# Patient Record
Sex: FEMALE | Race: WHITE | URBAN - METROPOLITAN AREA
[De-identification: names, ages, dates, MRNs, and addresses within clinical notes are randomized per-mention and may not be internally consistent; named-entity substitution may affect disease eponyms.]

---

## 2019-05-21 ENCOUNTER — NEW PATIENT (OUTPATIENT)
Dept: URBAN - METROPOLITAN AREA CLINIC 60 | Facility: CLINIC | Age: 74
End: 2019-05-21
Payer: COMMERCIAL

## 2019-05-21 DIAGNOSIS — H40.023 OPEN ANGLE WITH BORDERLINE FINDINGS, HIGH RISK, BILATERAL: ICD-10-CM

## 2019-05-21 DIAGNOSIS — H52.03 HYPERMETROPIA, BILATERAL: ICD-10-CM

## 2019-05-21 DIAGNOSIS — H43.813 VITREOUS DEGENERATION, BILATERAL: ICD-10-CM

## 2019-05-21 PROCEDURE — 76514 ECHO EXAM OF EYE THICKNESS: CPT | Performed by: OPTOMETRIST

## 2019-05-21 PROCEDURE — 92004 COMPRE OPH EXAM NEW PT 1/>: CPT | Performed by: OPTOMETRIST

## 2019-05-21 PROCEDURE — 92015 DETERMINE REFRACTIVE STATE: CPT | Performed by: OPTOMETRIST

## 2019-05-21 PROCEDURE — 92250 FUNDUS PHOTOGRAPHY W/I&R: CPT | Performed by: OPTOMETRIST

## 2019-05-21 RX ORDER — CARBOXYMETHYLCELLULOSE SODIUM, GLYCERIN 5; 9 MG/ML; MG/ML
SOLUTION/ DROPS OPHTHALMIC
Qty: 0 | Refills: 0 | Status: INACTIVE
Start: 2019-05-21 | End: 2021-05-19

## 2019-05-21 RX ORDER — PROPYLENE GLYCOL 0.06 MG/ML
0.6 % SOLUTION/ DROPS OPHTHALMIC
Qty: 0 | Refills: 0 | Status: INACTIVE
Start: 2019-05-21 | End: 2021-05-19

## 2019-05-21 ASSESSMENT — VISUAL ACUITY
OS: 20/30
OD: 20/20

## 2019-05-21 ASSESSMENT — INTRAOCULAR PRESSURE
OS: 15
OD: 14

## 2019-07-23 ENCOUNTER — FOLLOW UP ESTABLISHED (OUTPATIENT)
Dept: URBAN - METROPOLITAN AREA CLINIC 60 | Facility: CLINIC | Age: 74
End: 2019-07-23
Payer: COMMERCIAL

## 2019-07-23 PROCEDURE — 92133 CPTRZD OPH DX IMG PST SGM ON: CPT | Performed by: OPTOMETRIST

## 2019-07-23 PROCEDURE — 92012 INTRM OPH EXAM EST PATIENT: CPT | Performed by: OPTOMETRIST

## 2019-07-23 ASSESSMENT — INTRAOCULAR PRESSURE
OD: 26
OS: 22

## 2019-08-02 ENCOUNTER — FOLLOW UP ESTABLISHED (OUTPATIENT)
Dept: URBAN - METROPOLITAN AREA CLINIC 60 | Facility: CLINIC | Age: 74
End: 2019-08-02
Payer: COMMERCIAL

## 2019-08-02 DIAGNOSIS — H40.022 OPEN ANGLE WITH BORDERLINE FINDINGS, HIGH RISK, LEFT EYE: ICD-10-CM

## 2019-08-02 PROCEDURE — 92012 INTRM OPH EXAM EST PATIENT: CPT | Performed by: OPTOMETRIST

## 2019-08-02 PROCEDURE — 92083 EXTENDED VISUAL FIELD XM: CPT | Performed by: OPTOMETRIST

## 2019-08-02 RX ORDER — TIMOLOL MALEATE 5 MG/ML
0.5 % SOLUTION/ DROPS OPHTHALMIC
Qty: 1 | Refills: 0 | Status: INACTIVE
Start: 2019-08-02 | End: 2019-09-12

## 2019-08-02 ASSESSMENT — INTRAOCULAR PRESSURE
OS: 20
OD: 24

## 2019-09-12 ENCOUNTER — FOLLOW UP ESTABLISHED (OUTPATIENT)
Dept: URBAN - METROPOLITAN AREA CLINIC 60 | Facility: CLINIC | Age: 74
End: 2019-09-12
Payer: COMMERCIAL

## 2019-09-12 PROCEDURE — 92012 INTRM OPH EXAM EST PATIENT: CPT | Performed by: OPTOMETRIST

## 2019-09-12 ASSESSMENT — INTRAOCULAR PRESSURE
OD: 17
OS: 20

## 2020-01-16 ENCOUNTER — FOLLOW UP ESTABLISHED (OUTPATIENT)
Dept: URBAN - METROPOLITAN AREA CLINIC 60 | Facility: CLINIC | Age: 75
End: 2020-01-16
Payer: COMMERCIAL

## 2020-01-16 PROCEDURE — 92012 INTRM OPH EXAM EST PATIENT: CPT | Performed by: OPTOMETRIST

## 2020-01-16 RX ORDER — TIMOLOL MALEATE 5 MG/ML
0.5 % SOLUTION/ DROPS OPHTHALMIC
Qty: 3 | Refills: 3 | Status: INACTIVE
Start: 2020-01-16 | End: 2021-03-25

## 2020-01-16 ASSESSMENT — INTRAOCULAR PRESSURE
OS: 22
OD: 24

## 2020-05-13 ENCOUNTER — FOLLOW UP ESTABLISHED (OUTPATIENT)
Dept: URBAN - METROPOLITAN AREA CLINIC 60 | Facility: CLINIC | Age: 75
End: 2020-05-13
Payer: COMMERCIAL

## 2020-05-13 DIAGNOSIS — H25.13 AGE-RELATED NUCLEAR CATARACT, BILATERAL: ICD-10-CM

## 2020-05-13 DIAGNOSIS — H53.2 DIPLOPIA: Primary | ICD-10-CM

## 2020-05-13 PROCEDURE — 92250 FUNDUS PHOTOGRAPHY W/I&R: CPT | Performed by: OPTOMETRIST

## 2020-05-13 PROCEDURE — 92014 COMPRE OPH EXAM EST PT 1/>: CPT | Performed by: OPTOMETRIST

## 2020-05-13 ASSESSMENT — INTRAOCULAR PRESSURE
OD: 20
OS: 20

## 2020-05-13 ASSESSMENT — VISUAL ACUITY
OD: 20/25
OS: 20/25

## 2020-10-08 ENCOUNTER — FOLLOW UP ESTABLISHED (OUTPATIENT)
Dept: URBAN - METROPOLITAN AREA CLINIC 60 | Facility: CLINIC | Age: 75
End: 2020-10-08
Payer: COMMERCIAL

## 2020-10-08 PROCEDURE — 92133 CPTRZD OPH DX IMG PST SGM ON: CPT | Performed by: OPTOMETRIST

## 2020-10-08 PROCEDURE — 92083 EXTENDED VISUAL FIELD XM: CPT | Performed by: OPTOMETRIST

## 2020-10-08 PROCEDURE — 92012 INTRM OPH EXAM EST PATIENT: CPT | Performed by: OPTOMETRIST

## 2020-10-08 ASSESSMENT — INTRAOCULAR PRESSURE
OS: 18
OD: 16

## 2021-02-24 ENCOUNTER — FOLLOW UP ESTABLISHED (OUTPATIENT)
Dept: URBAN - METROPOLITAN AREA CLINIC 60 | Facility: CLINIC | Age: 76
End: 2021-02-24
Payer: COMMERCIAL

## 2021-02-24 PROCEDURE — 99213 OFFICE O/P EST LOW 20 MIN: CPT | Performed by: OPTOMETRIST

## 2021-02-24 ASSESSMENT — INTRAOCULAR PRESSURE
OS: 19
OD: 19

## 2021-05-19 ENCOUNTER — OFFICE VISIT (OUTPATIENT)
Dept: URBAN - METROPOLITAN AREA CLINIC 60 | Facility: CLINIC | Age: 76
End: 2021-05-19
Payer: COMMERCIAL

## 2021-05-19 DIAGNOSIS — H40.1111 PRIMARY OPEN-ANGLE GLAUCOMA, RIGHT EYE, MILD STAGE: Primary | ICD-10-CM

## 2021-05-19 DIAGNOSIS — H40.012 OPEN ANGLE WITH BORDERLINE FINDINGS, LOW RISK, LEFT EYE: ICD-10-CM

## 2021-05-19 DIAGNOSIS — H52.4 PRESBYOPIA: ICD-10-CM

## 2021-05-19 DIAGNOSIS — H25.813 COMBINED FORMS OF AGE-RELATED CATARACT, BILATERAL: ICD-10-CM

## 2021-05-19 PROCEDURE — 92250 FUNDUS PHOTOGRAPHY W/I&R: CPT | Performed by: OPTOMETRIST

## 2021-05-19 PROCEDURE — 92014 COMPRE OPH EXAM EST PT 1/>: CPT | Performed by: OPTOMETRIST

## 2021-05-19 RX ORDER — PROPYLENE GLYCOL 0.06 MG/ML
0.6 % SOLUTION/ DROPS OPHTHALMIC
Qty: 0 | Refills: 0 | Status: ACTIVE
Start: 2021-05-19

## 2021-05-19 ASSESSMENT — VISUAL ACUITY
OD: 20/25
OS: 20/20

## 2021-05-19 ASSESSMENT — INTRAOCULAR PRESSURE
OS: 19
OD: 18

## 2021-05-19 NOTE — IMPRESSION/PLAN
Impression: Primary open-angle glaucoma, mild stage, right eye *Drance heme OD 5/2019 - Resolved 1/16/2020 *thin pachs Plan: IOP stable today. Disc photos done today. Patient to continue Timolol QAM OD. Patient to return in October for IOP check, VF and RNFL OCT. Photos:  cupping-stable.

## 2021-12-01 ENCOUNTER — OFFICE VISIT (OUTPATIENT)
Dept: URBAN - METROPOLITAN AREA CLINIC 60 | Facility: CLINIC | Age: 76
End: 2021-12-01
Payer: COMMERCIAL

## 2021-12-01 PROCEDURE — 99213 OFFICE O/P EST LOW 20 MIN: CPT | Performed by: OPTOMETRIST

## 2021-12-01 PROCEDURE — 92133 CPTRZD OPH DX IMG PST SGM ON: CPT | Performed by: OPTOMETRIST

## 2021-12-01 PROCEDURE — 92083 EXTENDED VISUAL FIELD XM: CPT | Performed by: OPTOMETRIST

## 2021-12-01 ASSESSMENT — INTRAOCULAR PRESSURE
OD: 16
OS: 21

## 2021-12-01 NOTE — IMPRESSION/PLAN
Impression: Primary open-angle glaucoma, mild stage, right eye *Drance heme OD 5/2019 - Resolved 1/16/2020 *thin pachs Plan: IOP stable today. Patient educated on findings. Reviewed today's visual field and OCT(RNFL) results with patient. No changes in testing compared to last testing done. Patient to continue Timolol QAM OD.  Return in 6 months for a complete exam.

## 2022-06-01 ENCOUNTER — OFFICE VISIT (OUTPATIENT)
Dept: URBAN - METROPOLITAN AREA CLINIC 60 | Facility: CLINIC | Age: 77
End: 2022-06-01
Payer: COMMERCIAL

## 2022-06-01 DIAGNOSIS — H25.813 COMBINED FORMS OF AGE-RELATED CATARACT, BILATERAL: ICD-10-CM

## 2022-06-01 DIAGNOSIS — H04.123 TEAR FILM INSUFFICIENCY OF BILATERAL LACRIMAL GLANDS: ICD-10-CM

## 2022-06-01 DIAGNOSIS — H40.1131 PRIMARY OPEN-ANGLE GLAUCOMA, MILD STAGE, BILATERAL: Primary | ICD-10-CM

## 2022-06-01 DIAGNOSIS — H43.813 VITREOUS DEGENERATION, BILATERAL: ICD-10-CM

## 2022-06-01 PROCEDURE — 92133 CPTRZD OPH DX IMG PST SGM ON: CPT | Performed by: OPTOMETRIST

## 2022-06-01 PROCEDURE — 92014 COMPRE OPH EXAM EST PT 1/>: CPT | Performed by: OPTOMETRIST

## 2022-06-01 RX ORDER — PROPYLENE GLYCOL 0.06 MG/ML
0.6 % SOLUTION/ DROPS OPHTHALMIC
Qty: 4 | Refills: 0 | Status: ACTIVE
Start: 2022-06-01

## 2022-06-01 ASSESSMENT — VISUAL ACUITY
OS: 20/25
OD: 20/25

## 2022-06-01 ASSESSMENT — INTRAOCULAR PRESSURE
OS: 20
OD: 20

## 2022-06-01 NOTE — IMPRESSION/PLAN
Impression: Tear film insufficiency of bilateral lacrimal glands: H04.123. Plan: Diagnosis discussed with patient. Recommend patient use artificial tears 4 times a day. Sample of Systane Complete given.

## 2022-06-01 NOTE — IMPRESSION/PLAN
Impression: Primary open-angle glaucoma, mild stage, bilateral: H40.1131. *Start Timolol QAM OD (08/2019)* Plan: IOP is stable. Patient educated on findings. Reviewed last testing done. OCT(RNFL) done today to document any changes for future visits. Patient to continue with Timolol QAM OD. Return in 6 months for a IOP check and visual field.  

*New glasses rx given*

## 2022-12-05 ENCOUNTER — OFFICE VISIT (OUTPATIENT)
Dept: URBAN - METROPOLITAN AREA CLINIC 60 | Facility: CLINIC | Age: 77
End: 2022-12-05
Payer: COMMERCIAL

## 2022-12-05 DIAGNOSIS — H40.1131 PRIMARY OPEN-ANGLE GLAUCOMA, BILATERAL, MILD STAGE: Primary | ICD-10-CM

## 2022-12-05 PROCEDURE — 92083 EXTENDED VISUAL FIELD XM: CPT | Performed by: OPTOMETRIST

## 2022-12-05 PROCEDURE — 99213 OFFICE O/P EST LOW 20 MIN: CPT | Performed by: OPTOMETRIST

## 2022-12-05 ASSESSMENT — INTRAOCULAR PRESSURE
OD: 14
OS: 14

## 2022-12-05 NOTE — IMPRESSION/PLAN
Impression: Primary open-angle glaucoma, mild stage, bilateral: H40.1131. *Start Timolol QAM OD (08/2019)* Plan: IOP is stable. Patient educated on findings. Reviewed today's visual field and last testing done. Patient to continue with Timolol QAM OD. Return in 6 months for a complete exam and OCT(RNFL).

## 2023-06-02 ENCOUNTER — OFFICE VISIT (OUTPATIENT)
Dept: URBAN - METROPOLITAN AREA CLINIC 60 | Facility: CLINIC | Age: 78
End: 2023-06-02
Payer: COMMERCIAL

## 2023-06-02 DIAGNOSIS — H10.45 OTHER CHRONIC ALLERGIC CONJUNCTIVITIS: Primary | ICD-10-CM

## 2023-06-02 PROCEDURE — 92012 INTRM OPH EXAM EST PATIENT: CPT | Performed by: OPTOMETRIST

## 2023-06-02 RX ORDER — KETOROLAC TROMETHAMINE 5 MG/ML
0.5 % SOLUTION OPHTHALMIC
Qty: 5 | Refills: 0 | Status: ACTIVE
Start: 2023-06-02

## 2023-06-02 NOTE — IMPRESSION/PLAN
Impression: Other chronic allergic conjunctivitis: H10.45. Plan: OS>OD. Patient educated on findings. Will start patient on Ketorolac QID OS, erx'd to patient's pharmacy. Recommend patient use an allergy drop and perform cold compresses. Hand out for Pataday given to patient.

## 2023-07-11 ENCOUNTER — OFFICE VISIT (OUTPATIENT)
Dept: URBAN - METROPOLITAN AREA CLINIC 60 | Facility: CLINIC | Age: 78
End: 2023-07-11
Payer: MEDICARE

## 2023-07-11 DIAGNOSIS — H52.03 HYPERMETROPIA, BILATERAL: ICD-10-CM

## 2023-07-11 DIAGNOSIS — H40.1131 PRIMARY OPEN-ANGLE GLAUCOMA, BILATERAL, MILD STAGE: Primary | ICD-10-CM

## 2023-07-11 DIAGNOSIS — H43.813 VITREOUS DEGENERATION, BILATERAL: ICD-10-CM

## 2023-07-11 DIAGNOSIS — H25.813 COMBINED FORMS OF AGE-RELATED CATARACT, BILATERAL: ICD-10-CM

## 2023-07-11 PROCEDURE — 92133 CPTRZD OPH DX IMG PST SGM ON: CPT | Performed by: OPTOMETRIST

## 2023-07-11 PROCEDURE — 92014 COMPRE OPH EXAM EST PT 1/>: CPT | Performed by: OPTOMETRIST

## 2023-07-11 ASSESSMENT — VISUAL ACUITY
OD: 20/25
OS: 20/40

## 2023-07-11 ASSESSMENT — INTRAOCULAR PRESSURE
OS: 17
OD: 16

## 2023-07-11 NOTE — IMPRESSION/PLAN
Impression: Combined forms of age-related cataract, bilateral: H25.813. Plan: Patient educated regarding findings. Patient qualifies for cataract surgery OS but defers at this time. No treatment currently recommended due to level of vision OD. Patient will monitor vision changes and contact us with any decrease in vision.

## 2023-07-11 NOTE — IMPRESSION/PLAN
Impression: Primary open-angle glaucoma, mild stage, bilateral: H40.1131. *Start Timolol QAM OD (08/2019) (IOP 24-OD)* Plan: IOP is stable. Patient educated on findings. Reviewed today's OCT(RNFL) and last testing done. Patient to continue with Timolol QAM OD. Return in 6 months for a IOP check and visual field.

## 2023-12-22 PROBLEM — M25.562 LEFT KNEE PAIN: Status: ACTIVE | Noted: 2023-12-22

## 2023-12-22 PROBLEM — Z86.0100 HISTORY OF COLON POLYPS: Status: ACTIVE | Noted: 2023-12-22

## 2023-12-22 PROBLEM — J84.01: Status: ACTIVE | Noted: 2023-12-22

## 2023-12-22 PROBLEM — Z71.89 CARDIAC RISK COUNSELING: Status: ACTIVE | Noted: 2023-12-22

## 2023-12-22 PROBLEM — R77.8 ABNORMAL SPEP: Status: ACTIVE | Noted: 2023-12-22

## 2023-12-22 PROBLEM — G62.9 PERIPHERAL NEUROPATHY: Status: ACTIVE | Noted: 2023-12-22

## 2023-12-22 PROBLEM — Z86.010 HISTORY OF COLON POLYPS: Status: ACTIVE | Noted: 2023-12-22

## 2023-12-22 PROBLEM — I42.0 DILATED CARDIOMYOPATHY (MULTI): Status: ACTIVE | Noted: 2023-12-22

## 2023-12-22 PROBLEM — M75.81 TENDINITIS OF RIGHT ROTATOR CUFF: Status: ACTIVE | Noted: 2023-12-22

## 2023-12-22 PROBLEM — I10 HYPERTENSION: Status: ACTIVE | Noted: 2023-12-22

## 2023-12-22 PROBLEM — R26.81 UNSTEADY GAIT: Status: ACTIVE | Noted: 2023-12-22

## 2023-12-22 PROBLEM — H61.23 BILATERAL IMPACTED CERUMEN: Status: ACTIVE | Noted: 2023-12-22

## 2023-12-22 PROBLEM — M54.12 CERVICAL RADICULOPATHY: Status: ACTIVE | Noted: 2023-12-22

## 2023-12-22 PROBLEM — R20.2 PARESTHESIA: Status: ACTIVE | Noted: 2023-12-22

## 2023-12-22 PROBLEM — I44.7 LEFT BUNDLE BRANCH BLOCK (LBBB): Status: ACTIVE | Noted: 2023-12-22

## 2023-12-22 PROBLEM — F40.240 CLAUSTROPHOBIA: Status: ACTIVE | Noted: 2023-12-22

## 2023-12-22 PROBLEM — L91.8 INFLAMED SKIN TAG: Status: ACTIVE | Noted: 2023-12-22

## 2023-12-22 PROBLEM — M54.50 LUMBAGO: Status: ACTIVE | Noted: 2023-12-22

## 2023-12-22 PROBLEM — I71.21 ASCENDING AORTIC ANEURYSM (CMS-HCC): Status: ACTIVE | Noted: 2023-12-22

## 2024-01-03 ENCOUNTER — APPOINTMENT (OUTPATIENT)
Dept: CARDIOLOGY | Facility: CLINIC | Age: 79
End: 2024-01-03
Payer: MEDICARE

## 2024-01-10 ENCOUNTER — OFFICE VISIT (OUTPATIENT)
Dept: URBAN - METROPOLITAN AREA CLINIC 60 | Facility: CLINIC | Age: 79
End: 2024-01-10
Payer: MEDICARE

## 2024-01-10 DIAGNOSIS — H40.1131 PRIMARY OPEN-ANGLE GLAUCOMA, BILATERAL, MILD STAGE: Primary | ICD-10-CM

## 2024-01-10 PROBLEM — H61.20 IMPACTED CERUMEN: Status: ACTIVE | Noted: 2024-01-10

## 2024-01-10 PROBLEM — R93.89 ABNORMAL COMPUTERIZED AXIAL TOMOGRAPHY OF CHEST: Status: ACTIVE | Noted: 2024-01-10

## 2024-01-10 PROBLEM — Z86.19 HISTORY OF MUMPS: Status: ACTIVE | Noted: 2024-01-10

## 2024-01-10 PROBLEM — Z86.19 HISTORY OF VARICELLA: Status: ACTIVE | Noted: 2024-01-10

## 2024-01-10 PROBLEM — Z86.19 HISTORY OF MEASLES: Status: ACTIVE | Noted: 2024-01-10

## 2024-01-10 PROCEDURE — 92083 EXTENDED VISUAL FIELD XM: CPT | Performed by: OPTOMETRIST

## 2024-01-10 PROCEDURE — 92012 INTRM OPH EXAM EST PATIENT: CPT | Performed by: OPTOMETRIST

## 2024-01-10 ASSESSMENT — INTRAOCULAR PRESSURE
OS: 16
OD: 23

## 2024-01-18 ENCOUNTER — OFFICE VISIT (OUTPATIENT)
Dept: CARDIOLOGY | Facility: CLINIC | Age: 79
End: 2024-01-18
Payer: COMMERCIAL

## 2024-01-18 VITALS
OXYGEN SATURATION: 94 % | SYSTOLIC BLOOD PRESSURE: 125 MMHG | WEIGHT: 148 LBS | DIASTOLIC BLOOD PRESSURE: 65 MMHG | BODY MASS INDEX: 23.89 KG/M2 | HEART RATE: 60 BPM

## 2024-01-18 DIAGNOSIS — I42.0 DILATED CARDIOMYOPATHY (MULTI): ICD-10-CM

## 2024-01-18 DIAGNOSIS — I44.7 LEFT BUNDLE BRANCH BLOCK (LBBB): ICD-10-CM

## 2024-01-18 DIAGNOSIS — Z71.89 CARDIAC RISK COUNSELING: ICD-10-CM

## 2024-01-18 DIAGNOSIS — I10 HYPERTENSION, UNSPECIFIED TYPE: ICD-10-CM

## 2024-01-18 DIAGNOSIS — I71.21 ANEURYSM OF ASCENDING AORTA WITHOUT RUPTURE (CMS-HCC): Primary | ICD-10-CM

## 2024-01-18 PROCEDURE — 1036F TOBACCO NON-USER: CPT | Performed by: INTERNAL MEDICINE

## 2024-01-18 PROCEDURE — 99213 OFFICE O/P EST LOW 20 MIN: CPT | Performed by: INTERNAL MEDICINE

## 2024-01-18 PROCEDURE — 3078F DIAST BP <80 MM HG: CPT | Performed by: INTERNAL MEDICINE

## 2024-01-18 PROCEDURE — 1159F MED LIST DOCD IN RCRD: CPT | Performed by: INTERNAL MEDICINE

## 2024-01-18 PROCEDURE — 3074F SYST BP LT 130 MM HG: CPT | Performed by: INTERNAL MEDICINE

## 2024-01-18 RX ORDER — METOPROLOL SUCCINATE 25 MG/1
25 TABLET, EXTENDED RELEASE ORAL
COMMUNITY
Start: 2018-09-10 | End: 2024-01-18 | Stop reason: SDUPTHER

## 2024-01-18 RX ORDER — VIT A/VIT C/VIT E/ZINC/COPPER 2148-113
TABLET ORAL
COMMUNITY

## 2024-01-18 RX ORDER — CALCIUM CARBONATE 500(1250)
1 TABLET ORAL
COMMUNITY

## 2024-01-18 RX ORDER — LOSARTAN POTASSIUM 25 MG/1
25 TABLET ORAL DAILY
COMMUNITY
Start: 2018-04-27 | End: 2024-01-18 | Stop reason: SDUPTHER

## 2024-01-18 NOTE — PROGRESS NOTES
Chief Complaint:   Follow-up (Patient states they are here for a 6 month follow up)     History Of Present Illness:    Jacquelyn Chou is a 78 y.o. female presenting with cv dz.  Occasional palpitations  Patient denies chest pain/SOB/dizziness/lightheadedness/edema/claudication  Active-walks 10,000 steps per day/weights       Last Recorded Vitals:  Vitals:    01/18/24 1415 01/18/24 1436   BP: 124/76 125/65   BP Location: Left arm    Patient Position: Sitting    BP Cuff Size: Adult    Pulse: 60    SpO2: 94%    Weight: 67.1 kg (148 lb)             Allergies:  Patient has no known allergies.    Outpatient Medications:  Current Outpatient Medications   Medication Instructions    calcium carbonate (Oscal) 500 mg calcium (1,250 mg) tablet 1 tablet, oral, 2 times daily with meals    losartan (COZAAR) 25 mg, oral, Daily    metoprolol succinate XL (TOPROL-XL) 25 mg, oral, Daily RT    vitamins A,C,E-zinc-copper (PreserVision AREDS) 2,148 mcg-113 mg-45 mg-17.4mg tablet oral       Physical Exam:  Constitutional:       General: Awake.      Appearance: Healthy appearance. Not in distress.   Neck:      Vascular: No JVR. JVD normal.   Pulmonary:      Effort: Pulmonary effort is normal.      Breath sounds: Normal breath sounds. No wheezing. No rhonchi. No rales.   Chest:      Chest wall: Not tender to palpatation.   Cardiovascular:      PMI at left midclavicular line. Normal rate. Regular rhythm. Normal S1. Normal S2.       Murmurs: There is no murmur.      No gallop.  No click. No rub.   Pulses:     Intact distal pulses.   Edema:     Peripheral edema absent.   Abdominal:      General: Bowel sounds are normal.      Palpations: Abdomen is soft.      Tenderness: There is no abdominal tenderness.   Musculoskeletal: Normal range of motion.         General: No tenderness. Skin:     General: Skin is warm and dry.   Neurological:      General: No focal deficit present.      Mental Status: Alert and oriented to person, place and time.  "         Last Labs:  CBC -  No results found for: \"WBC\", \"HGB\", \"HCT\", \"MCV\", \"PLT\"    CMP -  No results found for: \"CALCIUM\", \"PHOS\", \"PROT\", \"ALBUMIN\", \"AST\", \"ALT\", \"ALKPHOS\", \"BILITOT\"    LIPID PANEL -   Lab Results   Component Value Date    CHOL 218 (H) 08/01/2022    TRIG 102 08/01/2022    HDL 79.1 08/01/2022    CHHDL 2.8 08/01/2022    LDLF 119 (H) 08/01/2022    VLDL 20 08/01/2022       RENAL FUNCTION PANEL -   No results found for: \"GLUCOSE\", \"NA\", \"K\", \"CL\", \"CO2\", \"ANIONGAP\", \"BUN\", \"CREATININE\", \"GFRMALE\", \"CALCIUM\", \"PHOS\", \"ALBUMIN\"     No results found for: \"BNP\", \"HGBA1C\"        Lab review: I have personally reviewed the laboratory result(s)       Problem List Items Addressed This Visit       Left bundle branch block (LBBB)    Overview     Chronic          Hypertension    Overview     BP OK on current meds         Dilated cardiomyopathy (CMS/HCC)    Overview     Patient reportedly has cardiomyopathy dating back to 2001. At that time, she had stress test for abnormal EKG. It reportedly showed anterior scar and EF 36%. Subsequent MUGA in 2008 with EF = 63%. Echo 2015 reported as EF 30%. Echo of 11/2017 showed EF 30% with global HK. Subsequent 5/2018 nuc stress test with NL perfusion and EF 50%. Most recent echo 12/2021 with EF 40-45% (same as 12/2019).  On beta blocker / ARB (had ACE cough)   She is active with no cardiac symptoms.   No beta blocker increase as BP on low side.         Relevant Medications    metoprolol succinate XL (Toprol-XL) 25 mg 24 hr tablet    Ascending aortic aneurysm (CMS/HCC) - Primary    Overview     4.0 cm in 2013   Stable per 11/2017 echo   3.8 cm per 12/2019 echo   Stable at 4.1 cm per 12/2021 echo   Recheck           Cardiac risk counseling    Overview     5/2021 lipids with LNNN=890, KFL=618, HDL=64   8/2022 lipids with UAR=675, HDL=79   Follow HH diet              Echo=CARYL Florentino, DO  "

## 2024-01-22 RX ORDER — LOSARTAN POTASSIUM 25 MG/1
25 TABLET ORAL DAILY
Qty: 90 TABLET | Refills: 3 | Status: SHIPPED | OUTPATIENT
Start: 2024-01-22

## 2024-01-22 RX ORDER — METOPROLOL SUCCINATE 25 MG/1
25 TABLET, EXTENDED RELEASE ORAL
Qty: 90 TABLET | Refills: 3 | Status: SHIPPED | OUTPATIENT
Start: 2024-01-22

## 2024-02-05 ENCOUNTER — APPOINTMENT (OUTPATIENT)
Dept: CARDIOLOGY | Facility: CLINIC | Age: 79
End: 2024-02-05
Payer: COMMERCIAL

## 2024-02-08 ENCOUNTER — HOSPITAL ENCOUNTER (OUTPATIENT)
Dept: CARDIOLOGY | Facility: CLINIC | Age: 79
Discharge: HOME | End: 2024-02-08
Payer: COMMERCIAL

## 2024-02-08 DIAGNOSIS — I42.0 DILATED CARDIOMYOPATHY (MULTI): ICD-10-CM

## 2024-02-08 DIAGNOSIS — I71.21 ANEURYSM OF ASCENDING AORTA WITHOUT RUPTURE (CMS-HCC): ICD-10-CM

## 2024-02-08 PROCEDURE — 93306 TTE W/DOPPLER COMPLETE: CPT | Performed by: INTERNAL MEDICINE

## 2024-02-08 PROCEDURE — 93306 TTE W/DOPPLER COMPLETE: CPT

## 2024-02-11 LAB
AORTIC VALVE MEAN GRADIENT: 6.1 MMHG
AORTIC VALVE PEAK VELOCITY: 1.76 M/S
AV PEAK GRADIENT: 12.4 MMHG
AVA (PEAK VEL): 1.76 CM2
AVA (VTI): 2.09 CM2
EJECTION FRACTION APICAL 4 CHAMBER: 33.7
EJECTION FRACTION: 35 %
LEFT VENTRICLE INTERNAL DIMENSION DIASTOLE: 5.39 CM (ref 3.5–6)
LEFT VENTRICULAR OUTFLOW TRACT DIAMETER: 2.03 CM
RIGHT VENTRICLE FREE WALL PEAK S': 0.12 CM/S
RIGHT VENTRICLE PEAK SYSTOLIC PRESSURE: 28 MMHG
TRICUSPID ANNULAR PLANE SYSTOLIC EXCURSION: 2.1 CM

## 2024-07-18 ENCOUNTER — OFFICE VISIT (OUTPATIENT)
Dept: URBAN - METROPOLITAN AREA CLINIC 60 | Facility: CLINIC | Age: 79
End: 2024-07-18
Payer: COMMERCIAL

## 2024-07-18 DIAGNOSIS — H43.813 VITREOUS DEGENERATION, BILATERAL: ICD-10-CM

## 2024-07-18 DIAGNOSIS — H25.813 COMBINED FORMS OF AGE-RELATED CATARACT, BILATERAL: ICD-10-CM

## 2024-07-18 DIAGNOSIS — H04.123 TEAR FILM INSUFFICIENCY OF BILATERAL LACRIMAL GLANDS: ICD-10-CM

## 2024-07-18 DIAGNOSIS — H40.1131 PRIMARY OPEN-ANGLE GLAUCOMA, MILD STAGE, BILATERAL: Primary | ICD-10-CM

## 2024-07-18 DIAGNOSIS — H47.021 BLEEDING IN OPTIC NERVE SHEATH OF RIGHT EYE: ICD-10-CM

## 2024-07-18 PROCEDURE — 92014 COMPRE OPH EXAM EST PT 1/>: CPT | Performed by: OPTOMETRIST

## 2024-07-18 PROCEDURE — 92133 CPTRZD OPH DX IMG PST SGM ON: CPT | Performed by: OPTOMETRIST

## 2024-07-18 RX ORDER — ALCAFTADINE 2.5 MG/ML
0.25 % SOLUTION/ DROPS OPHTHALMIC
Qty: 0 | Refills: 0 | Status: ACTIVE
Start: 2024-07-18

## 2024-07-18 RX ORDER — PROPYLENE GLYCOL 0.06 MG/ML
0.6 % SOLUTION/ DROPS OPHTHALMIC
Qty: 0 | Refills: 0 | Status: ACTIVE
Start: 2024-07-18

## 2024-07-18 ASSESSMENT — VISUAL ACUITY
OS: 20/30
OD: 20/25

## 2024-07-18 ASSESSMENT — INTRAOCULAR PRESSURE
OD: 14
OS: 16

## 2024-07-22 ENCOUNTER — APPOINTMENT (OUTPATIENT)
Dept: CARDIOLOGY | Facility: CLINIC | Age: 79
End: 2024-07-22
Payer: COMMERCIAL

## 2024-07-22 VITALS
DIASTOLIC BLOOD PRESSURE: 58 MMHG | BODY MASS INDEX: 24.05 KG/M2 | HEART RATE: 63 BPM | WEIGHT: 149 LBS | SYSTOLIC BLOOD PRESSURE: 112 MMHG

## 2024-07-22 DIAGNOSIS — I42.0 DILATED CARDIOMYOPATHY (MULTI): ICD-10-CM

## 2024-07-22 DIAGNOSIS — I71.21 ANEURYSM OF ASCENDING AORTA WITHOUT RUPTURE (CMS-HCC): ICD-10-CM

## 2024-07-22 DIAGNOSIS — I10 HYPERTENSION, UNSPECIFIED TYPE: ICD-10-CM

## 2024-07-22 DIAGNOSIS — I44.7 LEFT BUNDLE BRANCH BLOCK (LBBB): Primary | ICD-10-CM

## 2024-07-22 PROCEDURE — 1160F RVW MEDS BY RX/DR IN RCRD: CPT | Performed by: INTERNAL MEDICINE

## 2024-07-22 PROCEDURE — 1159F MED LIST DOCD IN RCRD: CPT | Performed by: INTERNAL MEDICINE

## 2024-07-22 PROCEDURE — 3078F DIAST BP <80 MM HG: CPT | Performed by: INTERNAL MEDICINE

## 2024-07-22 PROCEDURE — 1036F TOBACCO NON-USER: CPT | Performed by: INTERNAL MEDICINE

## 2024-07-22 PROCEDURE — G2211 COMPLEX E/M VISIT ADD ON: HCPCS | Performed by: INTERNAL MEDICINE

## 2024-07-22 PROCEDURE — 93000 ELECTROCARDIOGRAM COMPLETE: CPT | Performed by: INTERNAL MEDICINE

## 2024-07-22 PROCEDURE — 3074F SYST BP LT 130 MM HG: CPT | Performed by: INTERNAL MEDICINE

## 2024-07-22 PROCEDURE — 99213 OFFICE O/P EST LOW 20 MIN: CPT | Performed by: INTERNAL MEDICINE

## 2024-07-22 NOTE — PROGRESS NOTES
Chief Complaint:   Follow-up (Patient is here for a follow up/)     History Of Present Illness:    Jacquelyn Chou is a 79 y.o. female presenting after testing.  Occasional palpitations  Patient denies chest pain/SOB/dizziness/lightheadedness/edema/claudication  Active-walks 10,000 steps per day/weights       Last Recorded Vitals:  Vitals:    07/22/24 1115 07/22/24 1141   BP: 130/80 112/58   BP Location: Left arm    Patient Position: Sitting    BP Cuff Size: Adult    Pulse: 63    Weight: 67.6 kg (149 lb)             Allergies:  Patient has no known allergies.    Outpatient Medications:  Current Outpatient Medications   Medication Instructions    calcium carbonate (Oscal) 500 mg calcium (1,250 mg) tablet 1 tablet, oral, 2 times daily (morning and late afternoon)    DOCOSAHEXAENOIC ACID ORAL     losartan (COZAAR) 25 mg, oral, Daily    metoprolol succinate XL (TOPROL-XL) 25 mg, oral, Daily RT    vitamins A,C,E-zinc-copper (PreserVision AREDS) 2,148 mcg-113 mg-45 mg-17.4mg tablet oral       Physical Exam:  Constitutional:       General: Awake.      Appearance: Healthy appearance. Not in distress.   Neck:      Vascular: No JVR. JVD normal.   Pulmonary:      Effort: Pulmonary effort is normal.      Breath sounds: Normal breath sounds. No wheezing. No rhonchi. No rales.   Chest:      Chest wall: Not tender to palpatation.   Cardiovascular:      PMI at left midclavicular line. Normal rate. Regular rhythm. Normal S1. Normal S2.       Murmurs: There is no murmur.      No gallop.  No click. No rub.   Pulses:     Intact distal pulses.   Edema:     Peripheral edema absent.   Abdominal:      General: Bowel sounds are normal.      Palpations: Abdomen is soft.      Tenderness: There is no abdominal tenderness.   Musculoskeletal: Normal range of motion.         General: No tenderness. Skin:     General: Skin is warm and dry.   Neurological:      General: No focal deficit present.      Mental Status: Alert and oriented to person,  "place and time.          Last Labs:  CBC -  No results found for: \"WBC\", \"HGB\", \"HCT\", \"MCV\", \"PLT\"    CMP -  No results found for: \"CALCIUM\", \"PHOS\", \"PROT\", \"ALBUMIN\", \"AST\", \"ALT\", \"ALKPHOS\", \"BILITOT\"    LIPID PANEL -   Lab Results   Component Value Date    CHOL 218 (H) 08/01/2022    TRIG 102 08/01/2022    HDL 79.1 08/01/2022    CHHDL 2.8 08/01/2022    LDLF 119 (H) 08/01/2022    VLDL 20 08/01/2022       RENAL FUNCTION PANEL -   No results found for: \"GLUCOSE\", \"NA\", \"K\", \"CL\", \"CO2\", \"ANIONGAP\", \"BUN\", \"CREATININE\", \"GFRMALE\", \"CALCIUM\", \"PHOS\", \"ALBUMIN\"     No results found for: \"BNP\", \"HGBA1C\"        Lab review: I have personally reviewed the laboratory result(s)       Problem List Items Addressed This Visit       Left bundle branch block (LBBB) - Primary    Overview     Chronic          Hypertension    Overview     BP OK on current meds         Dilated cardiomyopathy (Multi)    Overview     Patient reportedly has cardiomyopathy dating back to 2001.   At that time, she had stress test for abnormal EKG. It reportedly showed anterior scar and EF 36%. Subsequent MUGA in 2008 with EF = 63%.   Echo 2015 reported as EF 30%. Echo of 11/2017 showed EF 30% with global HK. Subsequent 5/2018 nuc stress test with NL perfusion and EF 50%. Most recent echo 12/2021 with EF 40-45% (same as 12/2019).  2/2024 echo with EF=35-40%  On beta blocker / ARB (had ACE cough)   She is active with no cardiac symptoms.   No beta blocker increase as BP on low side.         Ascending aortic aneurysm (CMS-HCC)    Overview     4.0 cm in 2013   Stable per 11/2017 echo   3.8 cm per 12/2019 echo   Stable at 4.1 cm per 12/2021 echo   Stable at 4 cm per 2/2024 echo           Relevant Orders    ECG 12 Lead (Completed)           Denilson Florentino, DO  "

## 2025-01-02 PROBLEM — Z86.19 HISTORY OF VARICELLA: Status: RESOLVED | Noted: 2024-01-10 | Resolved: 2025-01-02

## 2025-01-02 PROBLEM — Z86.19 HISTORY OF MEASLES: Status: RESOLVED | Noted: 2024-01-10 | Resolved: 2025-01-02

## 2025-01-02 PROBLEM — Z86.19 HISTORY OF MUMPS: Status: RESOLVED | Noted: 2024-01-10 | Resolved: 2025-01-02

## 2025-01-05 DIAGNOSIS — I10 HYPERTENSION, UNSPECIFIED TYPE: ICD-10-CM

## 2025-01-05 DIAGNOSIS — I42.0 DILATED CARDIOMYOPATHY (MULTI): ICD-10-CM

## 2025-01-06 RX ORDER — METOPROLOL SUCCINATE 25 MG/1
25 TABLET, EXTENDED RELEASE ORAL DAILY
Qty: 90 TABLET | Refills: 1 | Status: SHIPPED | OUTPATIENT
Start: 2025-01-06

## 2025-01-06 RX ORDER — LOSARTAN POTASSIUM 25 MG/1
25 TABLET ORAL DAILY
Qty: 90 TABLET | Refills: 1 | Status: SHIPPED | OUTPATIENT
Start: 2025-01-06

## 2025-01-22 ENCOUNTER — APPOINTMENT (OUTPATIENT)
Dept: CARDIOLOGY | Facility: CLINIC | Age: 80
End: 2025-01-22
Payer: COMMERCIAL

## 2025-01-22 VITALS
DIASTOLIC BLOOD PRESSURE: 55 MMHG | OXYGEN SATURATION: 98 % | HEART RATE: 60 BPM | BODY MASS INDEX: 24.21 KG/M2 | WEIGHT: 150 LBS | SYSTOLIC BLOOD PRESSURE: 118 MMHG

## 2025-01-22 DIAGNOSIS — I44.7 LEFT BUNDLE BRANCH BLOCK (LBBB): Primary | ICD-10-CM

## 2025-01-22 DIAGNOSIS — I10 HYPERTENSION, UNSPECIFIED TYPE: ICD-10-CM

## 2025-01-22 DIAGNOSIS — I42.0 DILATED CARDIOMYOPATHY (MULTI): ICD-10-CM

## 2025-01-22 PROCEDURE — G2211 COMPLEX E/M VISIT ADD ON: HCPCS | Performed by: INTERNAL MEDICINE

## 2025-01-22 PROCEDURE — 1160F RVW MEDS BY RX/DR IN RCRD: CPT | Performed by: INTERNAL MEDICINE

## 2025-01-22 PROCEDURE — 3074F SYST BP LT 130 MM HG: CPT | Performed by: INTERNAL MEDICINE

## 2025-01-22 PROCEDURE — 1159F MED LIST DOCD IN RCRD: CPT | Performed by: INTERNAL MEDICINE

## 2025-01-22 PROCEDURE — 99214 OFFICE O/P EST MOD 30 MIN: CPT | Performed by: INTERNAL MEDICINE

## 2025-01-22 PROCEDURE — 3078F DIAST BP <80 MM HG: CPT | Performed by: INTERNAL MEDICINE

## 2025-01-22 PROCEDURE — 1036F TOBACCO NON-USER: CPT | Performed by: INTERNAL MEDICINE

## 2025-01-22 RX ORDER — METOPROLOL SUCCINATE 25 MG/1
25 TABLET, EXTENDED RELEASE ORAL DAILY
Qty: 90 TABLET | Refills: 3 | Status: SHIPPED | OUTPATIENT
Start: 2025-01-22 | End: 2026-01-22

## 2025-01-22 RX ORDER — METOPROLOL SUCCINATE 25 MG/1
25 TABLET, EXTENDED RELEASE ORAL DAILY
Qty: 90 TABLET | Refills: 3 | Status: SHIPPED | OUTPATIENT
Start: 2025-01-22 | End: 2025-01-22 | Stop reason: SDUPTHER

## 2025-01-22 RX ORDER — LOSARTAN POTASSIUM 25 MG/1
25 TABLET ORAL DAILY
Qty: 90 TABLET | Refills: 3 | Status: SHIPPED | OUTPATIENT
Start: 2025-01-22 | End: 2026-01-22

## 2025-01-22 RX ORDER — LOSARTAN POTASSIUM 25 MG/1
25 TABLET ORAL DAILY
Qty: 90 TABLET | Refills: 3 | Status: SHIPPED | OUTPATIENT
Start: 2025-01-22 | End: 2025-01-22 | Stop reason: SDUPTHER

## 2025-01-22 NOTE — PROGRESS NOTES
Cardiology Chief Complaint:   LBBB, Hypertension, and Cardiomyopathy (6 month follow up )     History Of Present Illness:    Jacquelyn Chou is a 79 y.o. female presenting today for cardiac follow-up  Occasional palpitations  Patient denies chest pain/SOB/dizziness/lightheadedness/edema/claudication  Active-active 10,000 steps per day/weights       Last Recorded Vitals:  Vitals:    01/22/25 1001 01/22/25 1018   BP: 142/90 118/55   BP Location: Right arm    Patient Position: Sitting    BP Cuff Size: Adult    Pulse: 60    SpO2: 98%    Weight: 68 kg (150 lb)             Allergies:  Patient has no known allergies.    Outpatient Medications:  Current Outpatient Medications   Medication Instructions    calcium carbonate (Oscal) 500 mg calcium (1,250 mg) tablet 1 tablet, 2 times daily (morning and late afternoon)    losartan (COZAAR) 25 mg, oral, Daily    metoprolol succinate XL (TOPROL-XL) 25 mg, oral, Daily    vitamins A,C,E-zinc-copper (PreserVision AREDS) 2,148 mcg-113 mg-45 mg-17.4mg tablet Take by mouth.       Physical Exam:  Constitutional:       General: Awake.      Appearance: Healthy appearance. Not in distress.   Neck:      Vascular: No JVR. JVD normal.   Pulmonary:      Effort: Pulmonary effort is normal.      Breath sounds: Normal breath sounds. No wheezing. No rhonchi. No rales.   Chest:      Chest wall: Not tender to palpatation.   Cardiovascular:      PMI at left midclavicular line. Normal rate. Regular rhythm. Normal S1. Normal S2.       Murmurs: There is no murmur.      No gallop.  No click. No rub.   Pulses:     Intact distal pulses.   Edema:     Peripheral edema absent.   Abdominal:      General: Bowel sounds are normal.      Palpations: Abdomen is soft.      Tenderness: There is no abdominal tenderness.   Musculoskeletal: Normal range of motion.         General: No tenderness. Skin:     General: Skin is warm and dry.   Neurological:      General: No focal deficit present.      Mental Status: Alert and  "oriented to person, place and time.          Last Labs:  CBC -  No results found for: \"WBC\", \"HGB\", \"HCT\", \"MCV\", \"PLT\"    CMP -  No results found for: \"CALCIUM\", \"PHOS\", \"PROT\", \"ALBUMIN\", \"AST\", \"ALT\", \"ALKPHOS\", \"BILITOT\"    LIPID PANEL -   Lab Results   Component Value Date    CHOL 218 (H) 08/01/2022    TRIG 102 08/01/2022    HDL 79.1 08/01/2022    CHHDL 2.8 08/01/2022    LDLF 119 (H) 08/01/2022    VLDL 20 08/01/2022       RENAL FUNCTION PANEL -   No results found for: \"GLUCOSE\", \"NA\", \"K\", \"CL\", \"CO2\", \"ANIONGAP\", \"BUN\", \"CREATININE\", \"GFRMALE\", \"CALCIUM\", \"PHOS\", \"ALBUMIN\"     No results found for: \"BNP\", \"HGBA1C\"        Lab review: I have personally reviewed the laboratory result(s)       Problem List Items Addressed This Visit       Left bundle branch block (LBBB) - Primary    Overview     Chronic          Hypertension    Overview     BP OK on current meds         Relevant Medications    losartan (Cozaar) 25 mg tablet    Dilated cardiomyopathy (Multi)    Overview     Patient reportedly has cardiomyopathy dating back to 2001.   At that time, she had stress test for abnormal EKG. It reportedly showed anterior scar and EF 36%. Subsequent MUGA in 2008 with EF = 63%.   Echo 2015 reported as EF 30%. Echo of 11/2017 showed EF 30% with global HK. Subsequent 5/2018 nuc stress test with NL perfusion and EF 50%. Most recent echo 12/2021 with EF 40-45% (same as 12/2019).  2/2024 echo with EF=35-40%  On beta blocker / ARB (had ACE cough)   She is active with no cardiac symptoms.   No beta blocker increase as DBP on low side.  Recheck echo         Relevant Medications    metoprolol succinate XL (Toprol-XL) 25 mg 24 hr tablet    Other Relevant Orders    Transthoracic echo (TTE) complete     Echo-CM      Denilson FROILAN Florentino, DO  "

## 2025-01-29 ENCOUNTER — HOSPITAL ENCOUNTER (OUTPATIENT)
Dept: CARDIOLOGY | Facility: CLINIC | Age: 80
Discharge: HOME | End: 2025-01-29
Payer: MEDICARE

## 2025-01-29 VITALS
BODY MASS INDEX: 23.95 KG/M2 | WEIGHT: 149 LBS | DIASTOLIC BLOOD PRESSURE: 55 MMHG | HEIGHT: 66 IN | SYSTOLIC BLOOD PRESSURE: 118 MMHG

## 2025-01-29 DIAGNOSIS — I42.0 DILATED CARDIOMYOPATHY (MULTI): ICD-10-CM

## 2025-01-29 LAB
AORTIC VALVE MEAN GRADIENT: 4 MMHG
AORTIC VALVE PEAK VELOCITY: 1.41 M/S
AV PEAK GRADIENT: 8 MMHG
AVA (PEAK VEL): 2.55 CM2
AVA (VTI): 2.34 CM2
EJECTION FRACTION APICAL 4 CHAMBER: 39.7
EJECTION FRACTION: 39 %
LEFT ATRIUM VOLUME AREA LENGTH INDEX BSA: 26.6 ML/M2
LEFT VENTRICLE INTERNAL DIMENSION DIASTOLE: 5.08 CM (ref 3.5–6)
LEFT VENTRICULAR OUTFLOW TRACT DIAMETER: 2.16 CM
MITRAL VALVE E/A RATIO: 0.45
RIGHT VENTRICLE FREE WALL PEAK S': 1.4 CM/S
RIGHT VENTRICLE PEAK SYSTOLIC PRESSURE: 22.8 MMHG
TRICUSPID ANNULAR PLANE SYSTOLIC EXCURSION: 2.6 CM

## 2025-01-29 PROCEDURE — 93306 TTE W/DOPPLER COMPLETE: CPT | Performed by: INTERNAL MEDICINE

## 2025-01-29 PROCEDURE — 93306 TTE W/DOPPLER COMPLETE: CPT

## 2025-04-17 ENCOUNTER — HOSPITAL ENCOUNTER (EMERGENCY)
Facility: HOSPITAL | Age: 80
Discharge: HOME | End: 2025-04-17
Attending: STUDENT IN AN ORGANIZED HEALTH CARE EDUCATION/TRAINING PROGRAM
Payer: MEDICARE

## 2025-04-17 ENCOUNTER — APPOINTMENT (OUTPATIENT)
Dept: RADIOLOGY | Facility: HOSPITAL | Age: 80
End: 2025-04-17
Payer: MEDICARE

## 2025-04-17 VITALS
DIASTOLIC BLOOD PRESSURE: 82 MMHG | OXYGEN SATURATION: 98 % | SYSTOLIC BLOOD PRESSURE: 130 MMHG | RESPIRATION RATE: 16 BRPM | HEART RATE: 60 BPM | TEMPERATURE: 97.2 F
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
## 2025-04-17 DIAGNOSIS — S09.90XA HEAD INJURY, INITIAL ENCOUNTER: ICD-10-CM

## 2025-04-17 PROCEDURE — 99284 EMERGENCY DEPT VISIT MOD MDM: CPT | Mod: 25 | Performed by: STUDENT IN AN ORGANIZED HEALTH CARE EDUCATION/TRAINING PROGRAM

## 2025-04-17 PROCEDURE — 70450 CT HEAD/BRAIN W/O DYE: CPT

## 2025-04-17 PROCEDURE — 72125 CT NECK SPINE W/O DYE: CPT | Performed by: RADIOLOGY

## 2025-04-17 PROCEDURE — 70450 CT HEAD/BRAIN W/O DYE: CPT | Performed by: RADIOLOGY

## 2025-04-17 PROCEDURE — 72125 CT NECK SPINE W/O DYE: CPT

## 2025-04-17 ASSESSMENT — COLUMBIA-SUICIDE SEVERITY RATING SCALE - C-SSRS
1. IN THE PAST MONTH, HAVE YOU WISHED YOU WERE DEAD OR WISHED YOU COULD GO TO SLEEP AND NOT WAKE UP?: NO
6. HAVE YOU EVER DONE ANYTHING, STARTED TO DO ANYTHING, OR PREPARED TO DO ANYTHING TO END YOUR LIFE?: NO
2. HAVE YOU ACTUALLY HAD ANY THOUGHTS OF KILLING YOURSELF?: NO

## 2025-04-17 ASSESSMENT — PAIN SCALES - GENERAL: PAINLEVEL_OUTOF10: 6

## 2025-04-17 NOTE — ED TRIAGE NOTES
Pt from home. States that she was straddling her toilet to clean. Pt slipped on her pants causing her to fall backwards and hit her head on her radiator. No LOC or BT.

## 2025-04-17 NOTE — ED PROVIDER NOTES
"HPI   Chief Complaint   Patient presents with    Fall       HPI  Patient is a 80-year-old female with PMH of hypertension who presents to [facility] ED 4/17 with complaints of back sided head pain after a fall.  She states that she was in the middle of cleaning her toilet after she accidentally slipped on the toilet seat and fell backwards hitting the back of her head on the wall.  She denies any lightheadedness or any symptoms before she fell.  She states that she felt a \"little woozy\" after getting up from the fall.  She denies fever, chills, N/V, chest pain, abdominal pain, numbness/tingling or any other symptoms.    Patient History   Medical History[1]  Surgical History[2]  Family History[3]  Social History[4]    Physical Exam   ED Triage Vitals [04/17/25 1252]   Temperature Heart Rate Respirations BP   36.2 °C (97.2 °F) 60 16 130/82      Pulse Ox Temp src Heart Rate Source Patient Position   98 % -- -- --      BP Location FiO2 (%)     Right arm --       Physical Exam  Constitutional:       Appearance: Normal appearance.   HENT:      Head: Normocephalic.      Comments: Backside of head tenderness  Eyes:      Pupils: Pupils are equal, round, and reactive to light.   Cardiovascular:      Rate and Rhythm: Normal rate and regular rhythm.      Pulses: Normal pulses.      Heart sounds: Normal heart sounds.   Pulmonary:      Effort: Pulmonary effort is normal.      Breath sounds: Normal breath sounds.   Abdominal:      General: Abdomen is flat.      Palpations: Abdomen is soft.   Musculoskeletal:         General: Normal range of motion.   Skin:     General: Skin is warm and dry.      Capillary Refill: Capillary refill takes less than 2 seconds.   Neurological:      General: No focal deficit present.      Mental Status: She is alert and oriented to person, place, and time.   Psychiatric:         Mood and Affect: Mood normal.         Behavior: Behavior normal.       ED Course & MDM   Diagnoses as of 04/17/25 1407   Fall, " initial encounter   Head injury, initial encounter                 No data recorded     Zee Coma Scale Score: 15 (04/17/25 1247 : Chay Martin RN)                     Medical Decision Making  Patient is a 80-year-old female who presents to the ED with complaints of back sided head pain that began after fall while cleaning her toilet this morning.  Physical exam was largely unremarkable except for some tenderness to palpation on patient's backside of head, no midline cervical tenderness.  Noncontrast CT head and CT C-spine were ordered with no acute findings.  Patient was informed of findings and was recommended for concussion protocol.  Patient is to return if symptoms worsen or new symptoms develop.  Patient okay for discharge to home.      Procedure  Procedures       Alejandro Garcia DO  Resident  04/17/25 4318      I did evaluate the patient independently from the resident and was present for key medical decision making.  Agree with disposition.  Any discrepancies between residents findings are detailed below.    This is an 80-year-old female presenting with spouse due to a mechanical fall at home striking her head.  She denies any associated loss of consciousness she is not on anticoagulant therapy there is no associated neck pain either.  She denies any further associated injuries or symptoms at this time.  Due to striking her head she is presenting for further evaluation.    VS: As documented in the triage note from today's date and EMR flowsheet were reviewed.  Gen: Well developed. No acute distress. Seated in bed. Appears nontoxic.  GCS 15.  Skin: Warm. Dry. Intact. No rashes or lesions.  Small scalp hematoma occiput no skin breakdown.  Eyes: Pupils equally round and reactive to light. Clear sclera. EOMI.  HENT: Atraumatic appearance with the exception as above. Mucosal membranes moist. No oral lesions, uvula midline, airway patent.  TMs clear bilaterally nares clear bilaterally.  No midline  cervical tenderness or step-offs.  No evidence of basilar skull fracture.  CV: Regular rate and regular rhythm. S1, S2. No pedal edema. Warm extremities.  Resp: Nonlabored breathing Clear to auscultation bilaterally. No increased work of breathing.   GI: Soft and nontender. No rebound or guarding. Bowel sounds x4 present.   MSK: Symmetric muscle bulk. No joint swelling in the extremities. Compartments are soft. Neurovascularly intact x4 extremities. Radial pulses +2 equal bilaterally.  Pedal pulses +2 equal bilaterally.  Extremities atraumatic no T or L-spine tenderness.  Neuro: Alert. CN II - XII intact. Speech fluent. Moving all extremities. No focal deficits. Gait normal.  Psych: Appropriate. Kempt.    Patient arrives hypertensive.  She was offered pain medications although declined CT imaging is pursued.  CT imaging shows no evidence of intracranial bleed or skull fracture.  Multiple incidental findings all thoroughly discussed with the patient she was provided copy of the report recommend close outpatient follow-up.  She is given concussion protocol Tylenol Motrin alternating as needed for mild to moderate pain and close follow with primary physician she is appreciative of care discharged in stable condition.    Information provided by the patient and spouse  Past medical history complicating hypertension  Previous medical records reviewed office visit 1/22/2025  Considered lab work although no indication at this time  Shared medical decision making patient agreeable with close outpatient follow-up with PCP.    RADIOLOGY:   Non-plain film images such as CT, Ultrasound and MRI are read by the radiologist. Plain radiographic images are visualized and preliminarily interpreted by the emergency physician with the below findings:      Interpretation per the Radiologist below, if available at the time of this note:    CT head wo IV contrast   Final Result   No acute intracranial pathologic findings are identified.    Age-related findings are present.        MACRO:   none        Signed by: Leon Garner 4/17/2025 2:28 PM   Dictation workstation:   [id number]      CT cervical spine wo IV contrast   Final Result   1. No evidence for a fracture on this exam. Cervical spondylosis as   described.   2. Ground-glass infiltrate with septal thickening both apices which   could be secondary to CHF. Please correlate with clinical history and   if indicated chest radiography.   3. 5 mm ground-glass nodule right apex is stable dating back to 2011   consistent with a benign etiology.             MACRO:   none        Signed by: Leon Garner 4/17/2025 2:34 PM   Dictation workstation:   [id number]          Alejandro Bell DO         [1]   Past Medical History:  Diagnosis Date    Abnormal findings on diagnostic imaging of other specified body structures     Abnormal CT of the chest    Other conditions influencing health status     History of cough    Personal history of other diseases of the musculoskeletal system and connective tissue     History of osteopenia    Personal history of other infectious and parasitic diseases     History of varicella    Personal history of other infectious and parasitic diseases     History of measles    Personal history of other infectious and parasitic diseases     History of mumps    Personal history of other medical treatment     H/O bone density study    Personal history of other medical treatment     H/O mammogram    Personal history of other specified conditions     History of syncope   [2]   Past Surgical History:  Procedure Laterality Date    VARICOSE VEIN SURGERY  09/23/2014    Varicose Vein Ligation   [3] No family history on file.  [4]   Social History  Tobacco Use    Smoking status: Never    Smokeless tobacco: Never   Substance Use Topics    Alcohol use: Not on file    Drug use: Not on file        Alejandro Bell DO  04/18/25 1019

## 2025-07-07 PROBLEM — H61.23 BILATERAL IMPACTED CERUMEN: Status: RESOLVED | Noted: 2023-12-22 | Resolved: 2025-07-07

## 2025-07-07 PROBLEM — M75.81 TENDINITIS OF RIGHT ROTATOR CUFF: Status: RESOLVED | Noted: 2023-12-22 | Resolved: 2025-07-07

## 2025-07-07 PROBLEM — R20.2 PARESTHESIA: Status: RESOLVED | Noted: 2023-12-22 | Resolved: 2025-07-07

## 2025-07-07 PROBLEM — H61.20 IMPACTED CERUMEN: Status: RESOLVED | Noted: 2024-01-10 | Resolved: 2025-07-07

## 2025-07-24 ENCOUNTER — OFFICE VISIT (OUTPATIENT)
Dept: CARDIOLOGY | Facility: CLINIC | Age: 80
End: 2025-07-24
Payer: MEDICARE

## 2025-07-24 VITALS
HEART RATE: 70 BPM | SYSTOLIC BLOOD PRESSURE: 110 MMHG | DIASTOLIC BLOOD PRESSURE: 52 MMHG | HEIGHT: 66 IN | WEIGHT: 145 LBS | OXYGEN SATURATION: 96 % | BODY MASS INDEX: 23.3 KG/M2

## 2025-07-24 DIAGNOSIS — I10 HYPERTENSION, UNSPECIFIED TYPE: ICD-10-CM

## 2025-07-24 DIAGNOSIS — I71.21 ANEURYSM OF ASCENDING AORTA WITHOUT RUPTURE: ICD-10-CM

## 2025-07-24 DIAGNOSIS — I44.7 LEFT BUNDLE BRANCH BLOCK (LBBB): Primary | ICD-10-CM

## 2025-07-24 DIAGNOSIS — I42.0 DILATED CARDIOMYOPATHY (MULTI): ICD-10-CM

## 2025-07-24 LAB
ATRIAL RATE: 74 BPM
P AXIS: 58 DEGREES
P OFFSET: 170 MS
P ONSET: 108 MS
PR INTERVAL: 184 MS
Q ONSET: 200 MS
QRS COUNT: 12 BEATS
QRS DURATION: 162 MS
QT INTERVAL: 430 MS
QTC CALCULATION(BAZETT): 477 MS
QTC FREDERICIA: 461 MS
R AXIS: -56 DEGREES
T AXIS: 92 DEGREES
T OFFSET: 415 MS
VENTRICULAR RATE: 74 BPM

## 2025-07-24 PROCEDURE — 93005 ELECTROCARDIOGRAM TRACING: CPT | Performed by: INTERNAL MEDICINE

## 2025-07-24 PROCEDURE — G2211 COMPLEX E/M VISIT ADD ON: HCPCS | Performed by: INTERNAL MEDICINE

## 2025-07-24 PROCEDURE — 1036F TOBACCO NON-USER: CPT | Performed by: INTERNAL MEDICINE

## 2025-07-24 PROCEDURE — 1159F MED LIST DOCD IN RCRD: CPT | Performed by: INTERNAL MEDICINE

## 2025-07-24 PROCEDURE — 3078F DIAST BP <80 MM HG: CPT | Performed by: INTERNAL MEDICINE

## 2025-07-24 PROCEDURE — 93010 ELECTROCARDIOGRAM REPORT: CPT | Performed by: INTERNAL MEDICINE

## 2025-07-24 PROCEDURE — 99213 OFFICE O/P EST LOW 20 MIN: CPT | Performed by: INTERNAL MEDICINE

## 2025-07-24 PROCEDURE — 3074F SYST BP LT 130 MM HG: CPT | Performed by: INTERNAL MEDICINE

## 2025-07-24 PROCEDURE — 99212 OFFICE O/P EST SF 10 MIN: CPT | Mod: 25

## 2025-07-24 NOTE — PROGRESS NOTES
"Cardiology Chief Complaint:   No chief complaint on file.     History Of Present Illness:    Jacquelyn Chou is a 80 y.o. female presenting today for cardiac follow-up    Patient denies chest pain/SOB/dizziness/lightheadedness/edema/claudication/palp  Active-active 10,000 steps per day/weights       Last Recorded Vitals:  Vitals:    07/24/25 1057 07/24/25 1118   BP: 122/80 110/52   BP Location: Right arm    Patient Position: Sitting    Pulse: 70    SpO2: 96%    Weight: 65.8 kg (145 lb)    Height: 1.676 m (5' 6\")             Allergies:  Patient has no known allergies.    Outpatient Medications:  Current Outpatient Medications   Medication Instructions    calcium carbonate (Oscal) 500 mg calcium (1,250 mg) tablet 1 tablet, 2 times daily (morning and late afternoon)    losartan (COZAAR) 25 mg, oral, Daily    metoprolol succinate XL (TOPROL-XL) 25 mg, oral, Daily    vitamins A,C,E-zinc-copper (PreserVision AREDS) 2,148 mcg-113 mg-45 mg-17.4mg tablet Take by mouth.       Physical Exam:  Constitutional:       General: Awake.      Appearance: Healthy appearance. Not in distress.   Neck:      Vascular: No JVR. JVD normal.   Pulmonary:      Effort: Pulmonary effort is normal.      Breath sounds: Normal breath sounds. No wheezing. No rhonchi. No rales.   Chest:      Chest wall: Not tender to palpatation.   Cardiovascular:      PMI at left midclavicular line. Normal rate. Regular rhythm. Normal S1. Normal S2.       Murmurs: There is no murmur.      No gallop.  No click. No rub.   Pulses:     Intact distal pulses.   Edema:     Peripheral edema absent.   Abdominal:      General: Bowel sounds are normal.      Palpations: Abdomen is soft.      Tenderness: There is no abdominal tenderness.   Musculoskeletal: Normal range of motion.         General: No tenderness. Skin:     General: Skin is warm and dry.   Neurological:      General: No focal deficit present.      Mental Status: Alert and oriented to person, place and time.      " "  Last Labs:  CBC -  No results found for: \"WBC\", \"HGB\", \"HCT\", \"MCV\", \"PLT\"    CMP -  No results found for: \"CALCIUM\", \"PHOS\", \"PROT\", \"ALBUMIN\", \"AST\", \"ALT\", \"ALKPHOS\", \"BILITOT\"    LIPID PANEL -   Lab Results   Component Value Date    CHOL 218 (H) 08/01/2022    TRIG 102 08/01/2022    HDL 79.1 08/01/2022    CHHDL 2.8 08/01/2022    LDLF 119 (H) 08/01/2022    VLDL 20 08/01/2022       RENAL FUNCTION PANEL -   No results found for: \"GLUCOSE\", \"NA\", \"K\", \"CL\", \"CO2\", \"ANIONGAP\", \"BUN\", \"CREATININE\", \"GFRMALE\", \"CALCIUM\", \"PHOS\", \"ALBUMIN\"     No results found for: \"BNP\", \"HGBA1C\"        Lab review: I have personally reviewed the laboratory result(s)       Problem List Items Addressed This Visit       Left bundle branch block (LBBB) - Primary    Overview   Chronic          Hypertension    Overview   BP OK on current meds  Check BMP         Dilated cardiomyopathy (Multi)    Overview   Patient reportedly has cardiomyopathy dating back to 2001.   At that time, she had stress test for abnormal EKG. It reportedly showed anterior scar and EF 36%. Subsequent MUGA in 2008 with EF = 63%.   Echo 2015 reported as EF 30%. Echo of 11/2017 showed EF 30% with global HK. Subsequent 5/2018 nuc stress test with NL perfusion and EF 50%. Most recent echo 12/2021 with EF 40-45% (same as 12/2019).  2/2024 echo with EF=35-40%  1/2025 EF =39%  On beta blocker / ARB (had ACE cough)   She is active with no cardiac symptoms.   No beta blocker increase as DBP on low side.  Follow         Relevant Orders    ECG 12 lead (Clinic Performed)    Ascending aortic aneurysm    Overview   4.0 cm in 2013   Stable per 11/2017 echo   3.8 cm per 12/2019 echo   Stable at 4.1 cm per 12/2021 echo   Stable at 4 cm per 2/2024 echo  4.4 cm per 1/2025 echo  Maintain good BP control              bmp  Denilson Florentino, DO  "

## 2025-07-25 ENCOUNTER — APPOINTMENT (OUTPATIENT)
Dept: CARDIOLOGY | Facility: CLINIC | Age: 80
End: 2025-07-25
Payer: MEDICARE

## 2025-07-25 LAB
ANION GAP SERPL CALCULATED.4IONS-SCNC: 11 MMOL/L (CALC) (ref 7–17)
BUN SERPL-MCNC: 14 MG/DL (ref 7–25)
BUN/CREAT SERPL: ABNORMAL (CALC) (ref 6–22)
CALCIUM SERPL-MCNC: 10.3 MG/DL (ref 8.6–10.4)
CHLORIDE SERPL-SCNC: 101 MMOL/L (ref 98–110)
CO2 SERPL-SCNC: 25 MMOL/L (ref 20–32)
CREAT SERPL-MCNC: 0.64 MG/DL (ref 0.6–0.95)
EGFRCR SERPLBLD CKD-EPI 2021: 88 ML/MIN/1.73M2
GLUCOSE SERPL-MCNC: 100 MG/DL (ref 65–99)
POTASSIUM SERPL-SCNC: 4.3 MMOL/L (ref 3.5–5.3)
SODIUM SERPL-SCNC: 137 MMOL/L (ref 135–146)